# Patient Record
Sex: MALE | Race: OTHER | NOT HISPANIC OR LATINO | Employment: OTHER | URBAN - METROPOLITAN AREA
[De-identification: names, ages, dates, MRNs, and addresses within clinical notes are randomized per-mention and may not be internally consistent; named-entity substitution may affect disease eponyms.]

---

## 2023-11-30 ENCOUNTER — OFFICE VISIT (OUTPATIENT)
Dept: RHEUMATOLOGY | Facility: CLINIC | Age: 73
End: 2023-11-30
Payer: MEDICARE

## 2023-11-30 ENCOUNTER — LAB (OUTPATIENT)
Dept: LAB | Facility: LAB | Age: 73
End: 2023-11-30
Payer: MEDICARE

## 2023-11-30 VITALS
SYSTOLIC BLOOD PRESSURE: 176 MMHG | HEIGHT: 68 IN | BODY MASS INDEX: 27.43 KG/M2 | DIASTOLIC BLOOD PRESSURE: 69 MMHG | WEIGHT: 181 LBS

## 2023-11-30 DIAGNOSIS — L40.50 PSORIATIC ARTHROPATHY (MULTI): ICD-10-CM

## 2023-11-30 DIAGNOSIS — L40.50 PSORIATIC ARTHROPATHY (MULTI): Primary | ICD-10-CM

## 2023-11-30 DIAGNOSIS — Z79.899 ENCOUNTER FOR LONG-TERM (CURRENT) USE OF MEDICATIONS: ICD-10-CM

## 2023-11-30 PROBLEM — M53.3 SACROILIAC JOINT PAIN: Status: ACTIVE | Noted: 2023-11-30

## 2023-11-30 PROBLEM — M25.511 RIGHT SHOULDER PAIN: Status: ACTIVE | Noted: 2023-11-30

## 2023-11-30 PROBLEM — R76.8 HEPATITIS B ANTIBODY POSITIVE: Status: ACTIVE | Noted: 2023-11-30

## 2023-11-30 PROBLEM — E78.5 HYPERLIPIDEMIA: Status: ACTIVE | Noted: 2023-11-30

## 2023-11-30 PROBLEM — M25.519 PAIN, JOINT, SHOULDER: Status: ACTIVE | Noted: 2023-11-30
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
## 2023-11-30 PROBLEM — G47.30 APNEA, SLEEP: Status: ACTIVE | Noted: 2023-11-30

## 2023-11-30 PROBLEM — M54.50 LOW BACK PAIN: Status: ACTIVE | Noted: 2023-11-30

## 2023-11-30 PROBLEM — M54.6 THORACIC BACK PAIN: Status: ACTIVE | Noted: 2023-11-30

## 2023-11-30 PROBLEM — R07.9 CHEST PAIN: Status: ACTIVE | Noted: 2023-11-30

## 2023-11-30 PROBLEM — R06.00 DYSPNEA AND RESPIRATORY ABNORMALITIES: Status: ACTIVE | Noted: 2023-11-30

## 2023-11-30 PROBLEM — R06.89 DYSPNEA AND RESPIRATORY ABNORMALITIES: Status: ACTIVE | Noted: 2023-11-30

## 2023-11-30 LAB
ALBUMIN SERPL BCP-MCNC: 4.1 G/DL (ref 3.4–5)
ALP SERPL-CCNC: 53 U/L (ref 33–136)
ALT SERPL W P-5'-P-CCNC: 17 U/L (ref 10–52)
ANION GAP SERPL CALC-SCNC: 12 MMOL/L (ref 10–20)
AST SERPL W P-5'-P-CCNC: 16 U/L (ref 9–39)
BILIRUB SERPL-MCNC: 0.4 MG/DL (ref 0–1.2)
BUN SERPL-MCNC: 16 MG/DL (ref 6–23)
CALCIUM SERPL-MCNC: 8.7 MG/DL (ref 8.6–10.3)
CHLORIDE SERPL-SCNC: 106 MMOL/L (ref 98–107)
CO2 SERPL-SCNC: 27 MMOL/L (ref 21–32)
CREAT SERPL-MCNC: 1.13 MG/DL (ref 0.5–1.3)
CRP SERPL-MCNC: 0.31 MG/DL
ERYTHROCYTE [DISTWIDTH] IN BLOOD BY AUTOMATED COUNT: 14.7 % (ref 11.5–14.5)
ERYTHROCYTE [SEDIMENTATION RATE] IN BLOOD BY WESTERGREN METHOD: 23 MM/H (ref 0–20)
GFR SERPL CREATININE-BSD FRML MDRD: 69 ML/MIN/1.73M*2
GLUCOSE SERPL-MCNC: 76 MG/DL (ref 74–99)
HCT VFR BLD AUTO: 42.7 % (ref 41–52)
HGB BLD-MCNC: 13.8 G/DL (ref 13.5–17.5)
MCH RBC QN AUTO: 28 PG (ref 26–34)
MCHC RBC AUTO-ENTMCNC: 32.3 G/DL (ref 32–36)
MCV RBC AUTO: 87 FL (ref 80–100)
NRBC BLD-RTO: 0 /100 WBCS (ref 0–0)
PLATELET # BLD AUTO: 177 X10*3/UL (ref 150–450)
POTASSIUM SERPL-SCNC: 4.3 MMOL/L (ref 3.5–5.3)
PROT SERPL-MCNC: 7.2 G/DL (ref 6.4–8.2)
RBC # BLD AUTO: 4.93 X10*6/UL (ref 4.5–5.9)
SODIUM SERPL-SCNC: 141 MMOL/L (ref 136–145)
WBC # BLD AUTO: 7.2 X10*3/UL (ref 4.4–11.3)

## 2023-11-30 PROCEDURE — 36415 COLL VENOUS BLD VENIPUNCTURE: CPT

## 2023-11-30 PROCEDURE — 86140 C-REACTIVE PROTEIN: CPT

## 2023-11-30 PROCEDURE — 85652 RBC SED RATE AUTOMATED: CPT

## 2023-11-30 PROCEDURE — 99213 OFFICE O/P EST LOW 20 MIN: CPT | Mod: PN | Performed by: INTERNAL MEDICINE

## 2023-11-30 PROCEDURE — 86481 TB AG RESPONSE T-CELL SUSP: CPT

## 2023-11-30 PROCEDURE — 80053 COMPREHEN METABOLIC PANEL: CPT

## 2023-11-30 PROCEDURE — 1159F MED LIST DOCD IN RCRD: CPT | Performed by: INTERNAL MEDICINE

## 2023-11-30 PROCEDURE — 85027 COMPLETE CBC AUTOMATED: CPT

## 2023-11-30 PROCEDURE — 99213 OFFICE O/P EST LOW 20 MIN: CPT | Performed by: INTERNAL MEDICINE

## 2023-11-30 RX ORDER — NAPROXEN SODIUM 220 MG/1
81 TABLET, FILM COATED ORAL DAILY
COMMUNITY

## 2023-11-30 RX ORDER — SECUKINUMAB 150 MG/ML
300 INJECTION SUBCUTANEOUS
COMMUNITY
Start: 2018-07-17

## 2023-11-30 RX ORDER — MELOXICAM 15 MG/1
1 TABLET ORAL DAILY PRN
COMMUNITY
Start: 2015-10-05

## 2023-11-30 RX ORDER — ROSUVASTATIN CALCIUM 10 MG/1
10 TABLET, COATED ORAL DAILY
COMMUNITY

## 2023-11-30 RX ORDER — SPIRONOLACTONE 25 MG/1
25 TABLET ORAL DAILY
COMMUNITY
Start: 2023-03-22

## 2023-11-30 RX ORDER — CARVEDILOL 25 MG/1
25 TABLET ORAL
COMMUNITY
Start: 2021-02-23

## 2023-11-30 RX ORDER — DOCUSATE SODIUM 100 MG/1
100 CAPSULE, LIQUID FILLED ORAL EVERY 24 HOURS
COMMUNITY

## 2023-11-30 RX ORDER — LOSARTAN POTASSIUM 100 MG/1
100 TABLET ORAL DAILY
COMMUNITY

## 2023-11-30 RX ORDER — SACUBITRIL AND VALSARTAN 97; 103 MG/1; MG/1
1 TABLET, FILM COATED ORAL 2 TIMES DAILY
COMMUNITY

## 2023-11-30 RX ORDER — CHOLECALCIFEROL (VITAMIN D3) 125 MCG
125 CAPSULE ORAL DAILY
COMMUNITY

## 2023-11-30 ASSESSMENT — ENCOUNTER SYMPTOMS: SHORTNESS OF BREATH: 0

## 2023-11-30 NOTE — PROGRESS NOTES
Subjective   Patient ID: Hector Parada is a 73 y.o. male who presents for Follow-up (Annual follow up).  HPI  Patient with history of psoriatic arthritis.  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  Previous medications have included methotrexate.  Started Cosentyx in 2018.    We last saw him 2022.  Unfortunately his wife was diagnosed with pancreatic cancer and  over a year ago.  He walks on a regular basis sometimes 5 miles.  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  He denies any infections or any falls.  Overall he has been doing well.  He has not had any shoulder issue.  He has no psoriasis.  Has not had any chest pains or breathing issues.      Review of Systems   Respiratory:  Negative for shortness of breath.    Cardiovascular:  Negative for chest pain.       Objective   Physical Exam  GEN: NAD A&O x3 appropriate affect  EYES: no conjunctival redness, eyelids normal glasses  SKIN: No psoriasis seen on exposed skin  MSK:  Mild hypertrophic changes seen in his hands but no acute synovitis or dactylitis no pain on of the right shoulder and good range of motion.  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  Has been doing well on this regimen and has no current issues with his right shoulder which has given him problems in the past or with his lower back.  He has been exercising on a regular basis.  Will have him do blood work today and have him come back in 1 year or as needed.  Will fill out his paperwork for patient assistance for Cosentyx.  Also discussed how Cosentyx now has an IV alternative that could be covered by Medicare.

## 2023-12-02 LAB
NIL(NEG) CONTROL SPOT COUNT: NORMAL
PANEL A SPOT COUNT: 0
PANEL B SPOT COUNT: 0
POS CONTROL SPOT COUNT: NORMAL
T-SPOT. TB INTERPRETATION: NEGATIVE

## 2023-12-06 NOTE — RESULT ENCOUNTER NOTE
I have reviewed your blood work from 11/30/2023.    Your inflammatory marker, sedimentation rate, was mildly elevated.  Your other inflammatory marker, C-reactive protein, was normal.    Your complete blood count did not show any significant anemia.    Your TB test was negative.    Your comprehensive metabolic panel showed normal liver and kidney functions.    Please contact me with any questions.

## 2024-12-12 ENCOUNTER — OFFICE VISIT (OUTPATIENT)
Dept: RHEUMATOLOGY | Facility: CLINIC | Age: 74
End: 2024-12-12
Payer: MEDICARE

## 2024-12-12 ENCOUNTER — LAB (OUTPATIENT)
Dept: LAB | Facility: LAB | Age: 74
End: 2024-12-12
Payer: MEDICARE

## 2024-12-12 VITALS
HEART RATE: 97 BPM | DIASTOLIC BLOOD PRESSURE: 72 MMHG | OXYGEN SATURATION: 84 % | SYSTOLIC BLOOD PRESSURE: 136 MMHG | BODY MASS INDEX: 26.64 KG/M2 | WEIGHT: 175.2 LBS

## 2024-12-12 DIAGNOSIS — Z79.899 ENCOUNTER FOR LONG-TERM (CURRENT) USE OF MEDICATIONS: ICD-10-CM

## 2024-12-12 DIAGNOSIS — L40.50 PSORIATIC ARTHROPATHY (MULTI): ICD-10-CM

## 2024-12-12 DIAGNOSIS — L40.50 PSORIATIC ARTHROPATHY (MULTI): Primary | ICD-10-CM

## 2024-12-12 LAB
ALBUMIN SERPL BCP-MCNC: 4 G/DL (ref 3.4–5)
ALP SERPL-CCNC: 70 U/L (ref 33–136)
ALT SERPL W P-5'-P-CCNC: 13 U/L (ref 10–52)
ANION GAP SERPL CALC-SCNC: 12 MMOL/L (ref 10–20)
AST SERPL W P-5'-P-CCNC: 12 U/L (ref 9–39)
BILIRUB SERPL-MCNC: 0.4 MG/DL (ref 0–1.2)
BUN SERPL-MCNC: 9 MG/DL (ref 6–23)
CALCIUM SERPL-MCNC: 8.7 MG/DL (ref 8.6–10.3)
CHLORIDE SERPL-SCNC: 105 MMOL/L (ref 98–107)
CO2 SERPL-SCNC: 27 MMOL/L (ref 21–32)
CREAT SERPL-MCNC: 1.05 MG/DL (ref 0.5–1.3)
CRP SERPL-MCNC: 0.51 MG/DL
EGFRCR SERPLBLD CKD-EPI 2021: 75 ML/MIN/1.73M*2
ERYTHROCYTE [DISTWIDTH] IN BLOOD BY AUTOMATED COUNT: 14.7 % (ref 11.5–14.5)
ERYTHROCYTE [SEDIMENTATION RATE] IN BLOOD BY WESTERGREN METHOD: 18 MM/H (ref 0–20)
GLUCOSE SERPL-MCNC: 94 MG/DL (ref 74–99)
HCT VFR BLD AUTO: 44.9 % (ref 41–52)
HGB BLD-MCNC: 13.9 G/DL (ref 13.5–17.5)
MCH RBC QN AUTO: 26.8 PG (ref 26–34)
MCHC RBC AUTO-ENTMCNC: 31 G/DL (ref 32–36)
MCV RBC AUTO: 87 FL (ref 80–100)
NRBC BLD-RTO: 0 /100 WBCS (ref 0–0)
PLATELET # BLD AUTO: 218 X10*3/UL (ref 150–450)
POTASSIUM SERPL-SCNC: 4.3 MMOL/L (ref 3.5–5.3)
PROT SERPL-MCNC: 6.7 G/DL (ref 6.4–8.2)
RBC # BLD AUTO: 5.18 X10*6/UL (ref 4.5–5.9)
SODIUM SERPL-SCNC: 140 MMOL/L (ref 136–145)
WBC # BLD AUTO: 6.9 X10*3/UL (ref 4.4–11.3)

## 2024-12-12 PROCEDURE — 1159F MED LIST DOCD IN RCRD: CPT | Performed by: INTERNAL MEDICINE

## 2024-12-12 PROCEDURE — 36415 COLL VENOUS BLD VENIPUNCTURE: CPT

## 2024-12-12 PROCEDURE — 99213 OFFICE O/P EST LOW 20 MIN: CPT | Performed by: INTERNAL MEDICINE

## 2024-12-12 PROCEDURE — 86140 C-REACTIVE PROTEIN: CPT

## 2024-12-12 PROCEDURE — 80053 COMPREHEN METABOLIC PANEL: CPT

## 2024-12-12 PROCEDURE — 86481 TB AG RESPONSE T-CELL SUSP: CPT

## 2024-12-12 PROCEDURE — 85027 COMPLETE CBC AUTOMATED: CPT

## 2024-12-12 PROCEDURE — 1126F AMNT PAIN NOTED NONE PRSNT: CPT | Performed by: INTERNAL MEDICINE

## 2024-12-12 PROCEDURE — 1036F TOBACCO NON-USER: CPT | Performed by: INTERNAL MEDICINE

## 2024-12-12 PROCEDURE — G2211 COMPLEX E/M VISIT ADD ON: HCPCS | Performed by: INTERNAL MEDICINE

## 2024-12-12 PROCEDURE — 85652 RBC SED RATE AUTOMATED: CPT

## 2024-12-12 ASSESSMENT — ENCOUNTER SYMPTOMS: SHORTNESS OF BREATH: 0

## 2024-12-12 ASSESSMENT — PAIN SCALES - GENERAL: PAINLEVEL_OUTOF10: 0-NO PAIN

## 2024-12-12 NOTE — PROGRESS NOTES
Subjective   Patient ID: Hector Parada is a 74 y.o. male who presents for Follow-up.  HPI  Patient with history of psoriatic arthritis.  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  Previous medications have included methotrexate.  Started Cosentyx in 2018.    Patient last seen in November 2023.  At that time we had him on Cosentyx 300 mg once a month.  He felt that he was doing stable.  He continues to be stable.  He was able to go to [place] and have dental implants put in in his upper teeth.  He denies any psoriasis.  Has some some mild dryness on his elbows.  Does not have much pain or shoulder issues.  Known knee issues.  Not really having any back problems.  Nothing new with his health since we last saw him.  Denies any chest pain or breathing problems.  No infections.  He just saw his PCP for his annual yesterday.  Review of Systems   Respiratory:  Negative for shortness of breath.    Cardiovascular:  Negative for chest pain.       Objective   Physical Exam  GEN: NAD A&O x3 appropriate affect  EYES: no conjunctival redness, eyelids normal glasses  SKIN: No psoriasis seen on exposed skin  MSK:  Mild hypertrophic changes seen in his hands but no acute synovitis or dactylitis no pain on of the right shoulder.  Some mild decreased range of motion of the right shoulder  Assessment/Plan       Psoriatic arthritis -has partial ankylosis of the sacroiliac joints   -currently on Cosentyx monotherapy 300 mg monthly.  Has had resolvent of his psoriasis and no active inflammatory arthritis.  Will have him do blood work.  He gets his Cosentyx through patient assistance.    Will see him again in 1 year

## 2024-12-19 ENCOUNTER — TELEPHONE (OUTPATIENT)
Dept: RHEUMATOLOGY | Facility: CLINIC | Age: 74
End: 2024-12-19
Payer: MEDICARE

## 2024-12-19 DIAGNOSIS — L40.50 PSORIATIC ARTHROPATHY (MULTI): Primary | ICD-10-CM

## 2024-12-27 ENCOUNTER — LAB (OUTPATIENT)
Dept: LAB | Facility: LAB | Age: 74
End: 2024-12-27
Payer: MEDICARE

## 2024-12-27 DIAGNOSIS — L40.50 PSORIATIC ARTHROPATHY (MULTI): ICD-10-CM

## 2024-12-27 PROCEDURE — 86481 TB AG RESPONSE T-CELL SUSP: CPT

## 2024-12-29 LAB
NIL(NEG) CONTROL SPOT COUNT: NORMAL
PANEL A SPOT COUNT: 1
PANEL B SPOT COUNT: 2
POS CONTROL SPOT COUNT: NORMAL
T-SPOT. TB INTERPRETATION: NEGATIVE